# Patient Record
Sex: MALE | Race: ASIAN | NOT HISPANIC OR LATINO | URBAN - METROPOLITAN AREA
[De-identification: names, ages, dates, MRNs, and addresses within clinical notes are randomized per-mention and may not be internally consistent; named-entity substitution may affect disease eponyms.]

---

## 2019-07-06 ENCOUNTER — EMERGENCY (EMERGENCY)
Facility: HOSPITAL | Age: 34
LOS: 1 days | Discharge: HOME | End: 2019-07-06
Attending: EMERGENCY MEDICINE
Payer: MEDICAID

## 2019-07-06 VITALS
TEMPERATURE: 99 F | WEIGHT: 164.91 LBS | HEIGHT: 66 IN | RESPIRATION RATE: 16 BRPM | SYSTOLIC BLOOD PRESSURE: 138 MMHG | DIASTOLIC BLOOD PRESSURE: 87 MMHG | HEART RATE: 99 BPM | OXYGEN SATURATION: 96 %

## 2019-07-06 PROCEDURE — 99283 EMERGENCY DEPT VISIT LOW MDM: CPT

## 2019-07-06 NOTE — ED ADULT TRIAGE NOTE - CHIEF COMPLAINT QUOTE
c/o gouty arthritis flare up x 6 days and coug x 5 days and requesting to get antibiotics for cough as per patient

## 2019-07-07 VITALS
SYSTOLIC BLOOD PRESSURE: 130 MMHG | TEMPERATURE: 98 F | DIASTOLIC BLOOD PRESSURE: 79 MMHG | HEART RATE: 88 BPM | RESPIRATION RATE: 16 BRPM | OXYGEN SATURATION: 98 %

## 2019-07-07 PROCEDURE — 99283 EMERGENCY DEPT VISIT LOW MDM: CPT

## 2019-07-07 RX ORDER — INDOMETHACIN 50 MG
50 CAPSULE ORAL ONCE
Refills: 0 | Status: COMPLETED | OUTPATIENT
Start: 2019-07-07 | End: 2019-07-07

## 2019-07-07 RX ORDER — FAMOTIDINE 10 MG/ML
1 INJECTION INTRAVENOUS
Qty: 20 | Refills: 0
Start: 2019-07-07

## 2019-07-07 RX ORDER — FAMOTIDINE 10 MG/ML
20 INJECTION INTRAVENOUS
Refills: 0 | Status: DISCONTINUED | OUTPATIENT
Start: 2019-07-07 | End: 2019-07-10

## 2019-07-07 RX ORDER — INDOMETHACIN 50 MG
1 CAPSULE ORAL
Qty: 15 | Refills: 0
Start: 2019-07-07 | End: 2019-07-11

## 2019-07-07 RX ADMIN — Medication 50 MILLIGRAM(S): at 02:31

## 2019-07-07 RX ADMIN — FAMOTIDINE 20 MILLIGRAM(S): 10 INJECTION INTRAVENOUS at 02:31

## 2019-07-07 RX ADMIN — Medication 50 MILLIGRAM(S): at 02:36

## 2019-07-07 NOTE — ED PROVIDER NOTE - PHYSICAL EXAMINATION
Erythema and swelling to dorsal base of R foot 1st toe.    No bony deformities, no leg length discrepancy, femoral and pedal pulses intact, cap refill < 2 seconds.

## 2019-07-07 NOTE — ED PROVIDER NOTE - CLINICAL SUMMARY MEDICAL DECISION MAKING FREE TEXT BOX
Pt is refusing uric acid levels and xrays of foot. States that he saw podiatrist that informed him that he had gout. I explained to pt that a base line uric acid level should be obtained and condition if gout may be due to over production or under excretion of uric acid. Pt still refuses diagnostics. I will rx Indomethacin and provide f/u for pt with [facility].   Pt is well appearing walking with normal gait, stable for discharge and follow up with medical doctor. Pt educated on care and need for follow up. Discussed anticipatory guidance and return precautions. Questions answered. I had a detailed discussion with the patient and/or guardian regarding the historical points, exam findings, and any diagnostic results supporting the discharge diagnosis.

## 2019-07-07 NOTE — ED PROVIDER NOTE - OBJECTIVE STATEMENT
32 y/o M with a significant PMHx of gout presents to the ED with complaints of tenderness and redness to dorsal base area of R foot 1st toe x 4 days. Patient states symptoms consistent with prior gout flare ups. Denies fever, trauma or any other acute complaints. Patient states he has had gout for the past 3 years, worsened after eating red meat.
